# Patient Record
(demographics unavailable — no encounter records)

---

## 2024-10-17 NOTE — ASSESSMENT
[FreeTextEntry1] : ASSESSMENT: The patient comes in today with chronic exacerbated symptoms of right elbow discomfort we have discussed bursitis and triceps tendinitis.  With this in mind we have discussed anti-inflammatory medication activity modification we have discussed possibility of tendon rupture with time.  We have discussed physical therapy.  We have discussed surgical options as well   The patient was adequately and thoroughly informed of my assessment of their current condition(s).  - This may diminish bodily function for the extremity. We discussed prognosis, tx modalities including operative and nonoperative options for the above diagnostic assessment. As always, 2nd opinion is always provided as an option.  When accessible, I was able to review other physicians note(s) including reviewing other tests, imaging results as well as personally view these results for my own interpretation.   The patient was adequately and thoroughly informed of my assessment of their current condition(s). A prescription for meloxicam was given today. The patient was instructed to take this with food and discontinue other NSAIDs while taking this. The risks, benefits and black box warnings were discussed. The patient was instructed to discontinue the medication if it began hurting his stomach.  DISCUSSION: 1.  Meloxicam as above.  PT prescription provided. 2. [x] 3. [x]

## 2024-10-17 NOTE — HISTORY OF PRESENT ILLNESS
[FreeTextEntry1] : Juwan is a very pleasant 53-year-old male who presents today with a chronic history of on and off right elbow discomfort swelling and episodes of weakness.  Patient describes history of martial arts, fishing as well as golf and these episodes inhibit some of these activities.